# Patient Record
Sex: MALE | Race: BLACK OR AFRICAN AMERICAN | NOT HISPANIC OR LATINO | Employment: FULL TIME | URBAN - METROPOLITAN AREA
[De-identification: names, ages, dates, MRNs, and addresses within clinical notes are randomized per-mention and may not be internally consistent; named-entity substitution may affect disease eponyms.]

---

## 2022-09-29 ENCOUNTER — HOSPITAL ENCOUNTER (EMERGENCY)
Facility: HOSPITAL | Age: 47
Discharge: HOME | End: 2022-09-29
Attending: STUDENT IN AN ORGANIZED HEALTH CARE EDUCATION/TRAINING PROGRAM
Payer: COMMERCIAL

## 2022-09-29 VITALS
HEART RATE: 85 BPM | OXYGEN SATURATION: 100 % | RESPIRATION RATE: 18 BRPM | SYSTOLIC BLOOD PRESSURE: 152 MMHG | DIASTOLIC BLOOD PRESSURE: 87 MMHG | TEMPERATURE: 98 F | WEIGHT: 175 LBS

## 2022-09-29 DIAGNOSIS — M79.18 MUSCULOSKELETAL PAIN: ICD-10-CM
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
## 2022-09-29 PROCEDURE — 99284 EMERGENCY DEPT VISIT MOD MDM: CPT

## 2022-09-29 RX ORDER — CYCLOBENZAPRINE HCL 10 MG
5 TABLET ORAL 3 TIMES DAILY PRN
Qty: 8 TABLET | Refills: 0 | Status: SHIPPED | OUTPATIENT
Start: 2022-09-29 | End: 2022-10-04

## 2022-09-29 RX ORDER — HYDROCODONE BITARTRATE AND ACETAMINOPHEN 5; 325 MG/1; MG/1
1 TABLET ORAL EVERY 6 HOURS PRN
Qty: 12 TABLET | Refills: 0 | Status: SHIPPED | OUTPATIENT
Start: 2022-09-29

## 2022-09-29 NOTE — Clinical Note
"Onofresteven Martedaysi Vidales was seen and treated in our emergency department on 9/29/2022.  He may return to work on 10/01/2022.       If you have any questions or concerns, please don't hesitate to call.      ZOILA Santizo"

## 2022-09-29 NOTE — ED PROVIDER NOTES
Encounter Date: 9/29/2022       History     Chief Complaint   Patient presents with    Fall     Pt c/o slipping and falling yesterday. Denies hitting head. Reports right shoulder and lower back pain. Refusing xrays at this time.     Patient states that he slipped and fell backwards yesterday while mopping a floor. Denies any LOC. States right shoulder pain and lower back pain. States pain worsens with movement and is intermittent. Patient rates pain as a 5/10. Denies any numbness or tingling to extremities. Denies any loss of bladder or bowel or any saddle anesthesia.     Review of patient's allergies indicates:  Not on File  No past medical history on file.  No past surgical history on file.  No family history on file.     Review of Systems   Constitutional: Negative.    HENT: Negative.     Eyes: Negative.    Respiratory: Negative.     Cardiovascular: Negative.  Negative for chest pain.   Gastrointestinal: Negative.  Negative for abdominal pain.   Endocrine: Negative.    Genitourinary: Negative.    Musculoskeletal:  Positive for back pain. Negative for neck pain.        Right shoulder pain.    Skin: Negative.    Allergic/Immunologic: Negative.    Neurological: Negative.  Negative for weakness, numbness and headaches.   Hematological: Negative.    Psychiatric/Behavioral: Negative.     All other systems reviewed and are negative.    Physical Exam     Initial Vitals [09/29/22 0914]   BP Pulse Resp Temp SpO2   (!) 152/87 85 18 97.9 °F (36.6 °C) 100 %      MAP       --         Physical Exam    Nursing note and vitals reviewed.  Constitutional: He appears well-developed and well-nourished. No distress.   HENT:   Head: Normocephalic and atraumatic.   Mouth/Throat: Oropharynx is clear and moist.   Eyes: Conjunctivae and EOM are normal. Pupils are equal, round, and reactive to light.   Neck: Neck supple.   Normal range of motion.  Cardiovascular:  Normal rate, regular rhythm, normal heart sounds and intact distal pulses.            Pulses:       Radial pulses are 2+ on the right side and 2+ on the left side.   Pulmonary/Chest: Breath sounds normal. No respiratory distress. He has no wheezes.   Abdominal: Abdomen is soft. He exhibits no distension. There is no abdominal tenderness. There is no rebound.   Musculoskeletal:         General: No edema. Normal range of motion.      Right shoulder: No swelling, deformity, tenderness or bony tenderness. Normal range of motion. Normal strength.      Left shoulder: Normal.      Cervical back: Normal range of motion and neck supple. No tenderness or bony tenderness. No pain with movement. Normal range of motion.      Thoracic back: No tenderness or bony tenderness. Normal range of motion.      Lumbar back: Tenderness (right paravertebral tenderness to palpation.) present. No bony tenderness. Normal range of motion.     Neurological: He is alert and oriented to person, place, and time. He has normal strength. GCS score is 15. GCS eye subscore is 4. GCS verbal subscore is 5. GCS motor subscore is 6.   Skin: Skin is warm and dry. No rash noted.   Psychiatric: He has a normal mood and affect. Thought content normal.       ED Course   Procedures  Labs Reviewed - No data to display       Imaging Results    None          Medications - No data to display  Medical Decision Making:   Initial Assessment:   Patient is awake, alert, neurovascular intact, and ambulatory in the ED.   Differential Diagnosis:   Fracture, Sprain, Strain.   ED Management:  Patient refuses any X-rays at this time.                         Clinical Impression:   Final diagnoses:  [W19.XXXA] Fall, initial encounter (Primary)  [M79.18] Musculoskeletal pain      ED Disposition Condition    Discharge Stable          ED Prescriptions       Medication Sig Dispense Start Date End Date Auth. Provider    cyclobenzaprine (FLEXERIL) 10 MG tablet Take 0.5 tablets (5 mg total) by mouth 3 (three) times daily as needed for Muscle spasms. 8 tablet  9/29/2022 10/4/2022 ZOILA Santizo    HYDROcodone-acetaminophen (NORCO) 5-325 mg per tablet Take 1 tablet by mouth every 6 (six) hours as needed for Pain. 12 tablet 9/29/2022 -- ZOILA Santizo          Follow-up Information       Follow up With Specialties Details Why Contact Info    Primary Care Provider  In 3 days               ZOILA Santizo  09/29/22 [id number]

## 2023-12-13 ENCOUNTER — OFFICE VISIT (OUTPATIENT)
Dept: URGENT CARE | Facility: CLINIC | Age: 48
End: 2023-12-13
Payer: COMMERCIAL

## 2023-12-13 VITALS
OXYGEN SATURATION: 100 % | TEMPERATURE: 99 F | SYSTOLIC BLOOD PRESSURE: 146 MMHG | WEIGHT: 182 LBS | BODY MASS INDEX: 25.48 KG/M2 | HEIGHT: 71 IN | DIASTOLIC BLOOD PRESSURE: 88 MMHG | HEART RATE: 94 BPM | RESPIRATION RATE: 18 BRPM

## 2023-12-13 DIAGNOSIS — I10 HYPERTENSION, UNSPECIFIED TYPE: ICD-10-CM

## 2023-12-13 DIAGNOSIS — G62.9 NEUROPATHY: ICD-10-CM

## 2023-12-13 DIAGNOSIS — M79.2 PERIPHERAL NEUROPATHIC PAIN: Primary | ICD-10-CM

## 2023-12-13 LAB — GLUCOSE SERPL-MCNC: 90 MG/DL (ref 70–110)

## 2023-12-13 PROCEDURE — 99203 OFFICE O/P NEW LOW 30 MIN: CPT | Mod: S$PBB,,,

## 2023-12-13 PROCEDURE — 99203 PR OFFICE/OUTPT VISIT, NEW, LEVL III, 30-44 MIN: ICD-10-PCS | Mod: S$PBB,,,

## 2023-12-13 PROCEDURE — 99214 OFFICE O/P EST MOD 30 MIN: CPT | Mod: PBBFAC

## 2023-12-13 PROCEDURE — 82962 GLUCOSE BLOOD TEST: CPT | Mod: PBBFAC

## 2023-12-13 RX ORDER — CAPSAICIN 0.75 MG/G
CREAM TOPICAL 3 TIMES DAILY
Qty: 57 G | Refills: 0 | Status: SHIPPED | OUTPATIENT
Start: 2023-12-13

## 2023-12-13 NOTE — LETTER
December 13, 2023      [facility]  [address] [address]  Phone: [phone]       Patient: Onofre Vidales   YOB: 1975  Date of Visit: 12/13/2023    To Whom It May Concern:    Jack Vidales  was at [facility] on 12/13/2023. The patient may return to work/school on 12/18/2023. with no restrictions. If you have any questions or concerns, or if I can be of further assistance, please do not hesitate to contact me.    Sincerely,    ZOILA Tabares

## 2023-12-13 NOTE — PROGRESS NOTES
"Subjective:       Patient ID: Onofre Vidales is a 48 y.o. male.    Vitals:  height is 5' 11" (1.803 m) and weight is 82.6 kg (182 lb). His oral temperature is 98.6 °F (37 °C). His blood pressure is 146/88 (abnormal) and his pulse is 94. His respiration is 18 and oxygen saturation is 100%.     Chief Complaint: foot numb  (Patient states the outsides of both feet feel "numb". Patient can still move toes and has feeling when touching foot. )    49 y/o AA male presents to clinic alone, reports bilateral lower foot numbness x 2 days, reports history of pinched back nerves. Denies any recent falls or injuries. Missed initial p;primary care appointment.         Constitution: Negative for chills, fatigue and fever.   Cardiovascular:  Negative for chest pain.   Respiratory:  Negative for shortness of breath.    Endocrine: cold intolerance, excessive thirst and excessive urination.   Neurological:  Positive for numbness. Negative for dizziness, loss of balance, headaches and tingling.       Objective:      Physical Exam   Constitutional: He is oriented to person, place, and time. He is cooperative. awake  HENT:   Head: Normocephalic and atraumatic.   Cardiovascular: Normal rate, regular rhythm, S1 normal, S2 normal and normal heart sounds.   Pulmonary/Chest: Effort normal and breath sounds normal.   Abdominal: Normal appearance.   Musculoskeletal:      Thoracic back: Normal.      Lumbar back: Normal.      Right lower leg: No edema.      Left lower leg: No edema.      Right foot: Normal.      Left foot: Normal.   Neurological: He is alert and oriented to person, place, and time. Gait normal. GCS eye subscore is 4. GCS verbal subscore is 5. GCS motor subscore is 6.   Skin: Skin is warm, dry and intact.   Psychiatric: His behavior is normal.   Nursing note and vitals reviewed.        Assessment:       1. Peripheral neuropathic pain    2. Neuropathy          Plan:     CBG is normal today in clinic , informed patient foot numbness " may be coming from nerves in lower back. He may need MRI imaging, which can be ordered outpatient by PCP. States he will make an appointment with PCP.     Lifestyle modifications to lower blood pressure.   Peripheral neuropathic pain  -     capsicum 0.075% (ZOSTRIX) 0.075 % topical cream; Apply topically 3 (three) times daily.  Dispense: 57 g; Refill: 0    Neuropathy  -     POCT Glucose, Hand-Held Device           Results for orders placed or performed in visit on 12/13/23   POCT Glucose, Hand-Held Device   Result Value Ref Range    POC Glucose 90 70 - 110 MG/DL